# Patient Record
Sex: MALE | HISPANIC OR LATINO | URBAN - METROPOLITAN AREA
[De-identification: names, ages, dates, MRNs, and addresses within clinical notes are randomized per-mention and may not be internally consistent; named-entity substitution may affect disease eponyms.]

---

## 2022-08-24 ENCOUNTER — APPOINTMENT (RX ONLY)
Dept: URBAN - METROPOLITAN AREA CLINIC 352 | Facility: CLINIC | Age: 27
Setting detail: DERMATOLOGY
End: 2022-08-24

## 2022-08-24 DIAGNOSIS — L91.0 HYPERTROPHIC SCAR: ICD-10-CM

## 2022-08-24 DIAGNOSIS — Z71.89 OTHER SPECIFIED COUNSELING: ICD-10-CM

## 2022-08-24 PROBLEM — D10.0 BENIGN NEOPLASM OF LIP: Status: ACTIVE | Noted: 2022-08-24

## 2022-08-24 PROCEDURE — ? ADDITIONAL NOTES

## 2022-08-24 PROCEDURE — ? COUNSELING

## 2022-08-24 PROCEDURE — 99203 OFFICE O/P NEW LOW 30 MIN: CPT

## 2022-08-24 PROCEDURE — ? FULL BODY SKIN EXAM - DECLINED

## 2022-08-24 ASSESSMENT — LOCATION ZONE DERM: LOCATION ZONE: ARM

## 2022-08-24 ASSESSMENT — LOCATION DETAILED DESCRIPTION DERM: LOCATION DETAILED: LEFT LATERAL SHOULDER

## 2022-08-24 ASSESSMENT — LOCATION SIMPLE DESCRIPTION DERM: LOCATION SIMPLE: LEFT SHOULDER

## 2022-08-24 NOTE — PROCEDURE: ADDITIONAL NOTES
Additional Notes: Offered ILK injections, patient deferred
Detail Level: Generalized
Render Risk Assessment In Note?: no